# Patient Record
Sex: FEMALE | URBAN - METROPOLITAN AREA
[De-identification: names, ages, dates, MRNs, and addresses within clinical notes are randomized per-mention and may not be internally consistent; named-entity substitution may affect disease eponyms.]

---

## 2021-03-05 ENCOUNTER — APPOINTMENT (RX ONLY)
Dept: URBAN - METROPOLITAN AREA CLINIC 55 | Facility: CLINIC | Age: 51
Setting detail: DERMATOLOGY
End: 2021-03-05

## 2021-03-05 DIAGNOSIS — Z41.9 ENCOUNTER FOR PROCEDURE FOR PURPOSES OTHER THAN REMEDYING HEALTH STATE, UNSPECIFIED: ICD-10-CM

## 2021-03-05 PROCEDURE — ? BOTOX

## 2021-03-05 NOTE — PROCEDURE: BOTOX
[place] Units: 0
Show Lcl Units: No
Show Additional Area 1: Yes
Forehead Units: 4
Glabellar Complex Units: 8
Price (Use Numbers Only, No Special Characters Or $): 207
Consent: Written consent obtained. Patient denies pregnancy and breastfeeding. Risks include but not limited to lid/brow ptosis, bruising, swelling, diplopia, temporary effect, incomplete chemical denervation.
Detail Level: Detailed
Additional Area 1 Location: upper lip
Post-Care Instructions: Patient instructed to not lie down for 4 hours post procedure and informed not to manipulate treatment area for 4 hours. \\nRecommended follow up in 7 days if needed.
Lot #: [id number]
Additional Area 2 Location: chin
Expiration Date (Month Year): 12/2023
Periorbital Skin Units: 16

## 2021-05-27 ENCOUNTER — APPOINTMENT (RX ONLY)
Dept: URBAN - METROPOLITAN AREA CLINIC 55 | Facility: CLINIC | Age: 51
Setting detail: DERMATOLOGY
End: 2021-05-27

## 2021-05-27 DIAGNOSIS — Z41.9 ENCOUNTER FOR PROCEDURE FOR PURPOSES OTHER THAN REMEDYING HEALTH STATE, UNSPECIFIED: ICD-10-CM

## 2021-05-27 PROCEDURE — ? BOTOX

## 2021-05-27 NOTE — PROCEDURE: BOTOX
Additional Area 6 Units: 0
Detail Level: Detailed
Show Additional Area 6: Yes
Show Lcl Units: No
Forehead Units: 4
Additional Area 3 Location: Chin
Consent: Written consent obtained. Risks include but not limited to lid/brow ptosis, bruising, swelling, diplopia, temporary effect, incomplete chemical denervation.
Periorbital Skin Units: 16
Lot #: [id number]
Additional Area 2 Location: upper and lower lip
Additional Area 1 Location: Lateral Brows
Glabellar Complex Units: 8
Expiration Date (Month Year): 9/2023
Post-Care Instructions: Patient instructed to not lie down for 4 hours and limit physical activity for 24 hours.

## 2021-10-01 ENCOUNTER — APPOINTMENT (RX ONLY)
Dept: URBAN - METROPOLITAN AREA CLINIC 55 | Facility: CLINIC | Age: 51
Setting detail: DERMATOLOGY
End: 2021-10-01

## 2021-10-01 DIAGNOSIS — Z41.9 ENCOUNTER FOR PROCEDURE FOR PURPOSES OTHER THAN REMEDYING HEALTH STATE, UNSPECIFIED: ICD-10-CM

## 2021-10-01 PROCEDURE — ? BOTOX

## 2021-10-01 NOTE — PROCEDURE: BOTOX
Left Periorbital Skin Units: 0
Forehead Units: 4
Show Right And Left Brow Units: No
Show Lateral Platysmal Band Units: Yes
Additional Area 4 Location: upper cutaneous lip
Lot #: [id number]
Additional Area 3 Location: Chin
Glabellar Complex Units: 8
Additional Area 2 Location: Upper lip
Post-Care Instructions: Patient instructed to not lie down for 4 hours and limit physical activity for 24 hours.
Periorbital Skin Units: 16
Detail Level: Detailed
Additional Area 1 Location: Left Lateral Brow
Expiration Date (Month Year): 11/2023
Consent: Written consent obtained. Risks include but not limited to lid/brow ptosis, bruising, swelling, diplopia, temporary effect, incomplete chemical denervation.

## 2022-02-09 ENCOUNTER — APPOINTMENT (RX ONLY)
Dept: URBAN - METROPOLITAN AREA CLINIC 55 | Facility: CLINIC | Age: 52
Setting detail: DERMATOLOGY
End: 2022-02-09

## 2022-02-09 DIAGNOSIS — Z41.9 ENCOUNTER FOR PROCEDURE FOR PURPOSES OTHER THAN REMEDYING HEALTH STATE, UNSPECIFIED: ICD-10-CM

## 2022-02-09 PROCEDURE — ? BOTOX

## 2022-02-09 NOTE — PROCEDURE: BOTOX
[facility] Units: 0
Periorbital Skin Units: 16
Show Forehead Units: Yes
Show Ucl Units: No
Consent: Written consent obtained. Risks include but not limited to lid/brow ptosis, bruising, swelling, diplopia, temporary effect, incomplete chemical denervation.
Forehead Units: 4
Additional Area 2 Location: Upper lip
Additional Area 1 Location: Left Lateral Brow
Additional Area 3 Location: Chin
Additional Area 4 Location: upper cutaneous lip
Post-Care Instructions: Patient instructed to not lie down for 4 hours and limit physical activity for 24 hours.
Glabellar Complex Units: 8
Expiration Date (Month Year): 4/2024
Detail Level: Detailed
Lot #: [id number]

## 2022-05-24 ENCOUNTER — APPOINTMENT (RX ONLY)
Dept: URBAN - METROPOLITAN AREA CLINIC 55 | Facility: CLINIC | Age: 52
Setting detail: DERMATOLOGY
End: 2022-05-24

## 2022-05-24 DIAGNOSIS — Z41.9 ENCOUNTER FOR PROCEDURE FOR PURPOSES OTHER THAN REMEDYING HEALTH STATE, UNSPECIFIED: ICD-10-CM

## 2022-05-24 PROCEDURE — ? BOTOX

## 2022-05-24 NOTE — PROCEDURE: BOTOX
Additional Area 2 Units: 0
Show Ucl Units: No
Expiration Date (Month Year): 4/2024
Show Nasal Units: Yes
Lot #: [id number]
Additional Area 2 Location: Upper lip
Additional Area 3 Location: Chin
Consent: Written consent obtained. Risks include but not limited to lid/brow ptosis, bruising, swelling, diplopia, temporary effect, incomplete chemical denervation.
Glabellar Complex Units: 8
Post-Care Instructions: Patient instructed to not lie down for 4 hours and limit physical activity for 24 hours.
Detail Level: Detailed
Periorbital Skin Units: 16
Additional Area 1 Location: Left Lateral Brow
Dilution (U/0.1 Cc): 4

## 2022-09-17 ENCOUNTER — APPOINTMENT (RX ONLY)
Dept: URBAN - METROPOLITAN AREA CLINIC 55 | Facility: CLINIC | Age: 52
Setting detail: DERMATOLOGY
End: 2022-09-17

## 2022-09-17 DIAGNOSIS — Z41.9 ENCOUNTER FOR PROCEDURE FOR PURPOSES OTHER THAN REMEDYING HEALTH STATE, UNSPECIFIED: ICD-10-CM

## 2022-09-17 PROCEDURE — ? BOTOX

## 2022-09-17 NOTE — PROCEDURE: BOTOX
R Brow Units: 0
Show Additional Area 6: Yes
Show Right And Left Brow Units: No
Show Inventory Tab: Show
Consent: Verbal consent obtained. Risks include but not limited to lid/brow ptosis, bruising, swelling, diplopia, temporary effect, incomplete chemical denervation.
Additional Area 2 Location: chin
Post-Care Instructions: Patient instructed to not lie down for 4 hours and limit physical activity for 24 hours.
Periorbital Skin Units: 16
Detail Level: Detailed
Forehead Units: 4
Additional Area 1 Location: upper lip
Glabellar Complex Units: 8

## 2023-01-07 ENCOUNTER — APPOINTMENT (RX ONLY)
Dept: URBAN - METROPOLITAN AREA CLINIC 55 | Facility: CLINIC | Age: 53
Setting detail: DERMATOLOGY
End: 2023-01-07

## 2023-01-07 DIAGNOSIS — Z41.9 ENCOUNTER FOR PROCEDURE FOR PURPOSES OTHER THAN REMEDYING HEALTH STATE, UNSPECIFIED: ICD-10-CM

## 2023-01-07 PROCEDURE — ? BOTOX

## 2023-01-07 NOTE — PROCEDURE: BOTOX
Left Periorbital Skin Units: 0
Show Orbicularis Oculi Units: Yes
Show Right And Left Pupillary Line Units: No
Consent: Verbal consent obtained. Risks include but not limited to lid/brow ptosis, bruising, swelling, diplopia, temporary effect, incomplete chemical denervation.
Forehead Units: 4
Detail Level: Detailed
Glabellar Complex Units: 8
Additional Area 1 Location: upper lip
Post-Care Instructions: Patient instructed to not lie down for 4 hours and limit physical activity for 24 hours.
Show Inventory Tab: Show
Periorbital Skin Units: 16
Additional Area 2 Location: chin

## 2023-05-18 ENCOUNTER — APPOINTMENT (RX ONLY)
Dept: URBAN - METROPOLITAN AREA CLINIC 55 | Facility: CLINIC | Age: 53
Setting detail: DERMATOLOGY
End: 2023-05-18

## 2023-05-18 DIAGNOSIS — Z41.9 ENCOUNTER FOR PROCEDURE FOR PURPOSES OTHER THAN REMEDYING HEALTH STATE, UNSPECIFIED: ICD-10-CM

## 2023-05-18 PROCEDURE — ? BOTOX

## 2023-05-18 NOTE — PROCEDURE: BOTOX
Forehead Units: 0
Show Additional Area 3: Yes
Glabellar Complex Units: 8
Dilution (U/0.1 Cc): 4
Show Mentalis Units: No
Additional Area 1 Location: upper lip
Detail Level: Detailed
Consent: Verbal consent obtained. Risks include but not limited to lid/brow ptosis, bruising, swelling, diplopia, temporary effect, incomplete chemical denervation.
Periorbital Skin Units: 16
Additional Area 3 Location: lower lip
Post-Care Instructions: Patient instructed to not lie down for 4 hours and limit physical activity for 24 hours.
Incrementing Botox Units: By 0.5 Units
Show Inventory Tab: Show
Additional Area 2 Location: chin

## 2023-09-21 ENCOUNTER — APPOINTMENT (RX ONLY)
Dept: URBAN - METROPOLITAN AREA CLINIC 55 | Facility: CLINIC | Age: 53
Setting detail: DERMATOLOGY
End: 2023-09-21

## 2023-09-21 DIAGNOSIS — Z41.9 ENCOUNTER FOR PROCEDURE FOR PURPOSES OTHER THAN REMEDYING HEALTH STATE, UNSPECIFIED: ICD-10-CM

## 2023-09-21 PROCEDURE — ? BOTOX

## 2023-09-21 NOTE — PROCEDURE: BOTOX
Show Right And Left Brow Units: No
Left Pupillary Line Units: 0
Show Depressor Anguli Units: Yes
Detail Level: Detailed
Periorbital Skin Units: 16
Additional Area 3 Location: lower lip
Forehead Units: 4
Show Inventory Tab: Show
Consent: Verbal consent obtained. Risks include but not limited to lid/brow ptosis, bruising, swelling, diplopia, temporary effect, incomplete chemical denervation.
Additional Area 2 Location: chin
Post-Care Instructions: Patient instructed to not lie down for 4 hours and limit physical activity for 24 hours.
Glabellar Complex Units: 8
Additional Area 1 Location: upper lip
Incrementing Botox Units: By 0.5 Units

## 2024-01-19 ENCOUNTER — APPOINTMENT (RX ONLY)
Dept: URBAN - METROPOLITAN AREA CLINIC 55 | Facility: CLINIC | Age: 54
Setting detail: DERMATOLOGY
End: 2024-01-19

## 2024-02-01 ENCOUNTER — APPOINTMENT (RX ONLY)
Dept: URBAN - METROPOLITAN AREA CLINIC 55 | Facility: CLINIC | Age: 54
Setting detail: DERMATOLOGY
End: 2024-02-01

## 2024-02-01 DIAGNOSIS — Z41.9 ENCOUNTER FOR PROCEDURE FOR PURPOSES OTHER THAN REMEDYING HEALTH STATE, UNSPECIFIED: ICD-10-CM

## 2024-02-01 PROCEDURE — ? BOTOX

## 2024-02-01 NOTE — PROCEDURE: BOTOX
Mentalis Units: 0
Show Levator Superior Units: Yes
Additional Area 3 Location: lower lip
Show Inventory Tab: Show
Show Ucl Units: No
Post-Care Instructions: Patient instructed to not lie down for 4 hours and limit physical activity for 24 hours.
Consent: Verbal consent obtained. Risks include but not limited to lid/brow ptosis, bruising, swelling, diplopia, temporary effect, incomplete chemical denervation.
Dilution (U/0.1 Cc): 4
Additional Area 2 Location: chin
Glabellar Complex Units: 12
Incrementing Botox Units: By 0.5 Units
Additional Area 1 Location: upper lip
Detail Level: Detailed
Periorbital Skin Units: 16

## 2024-05-23 ENCOUNTER — APPOINTMENT (RX ONLY)
Dept: URBAN - METROPOLITAN AREA CLINIC 55 | Facility: CLINIC | Age: 54
Setting detail: DERMATOLOGY
End: 2024-05-23

## 2024-05-23 DIAGNOSIS — Z41.9 ENCOUNTER FOR PROCEDURE FOR PURPOSES OTHER THAN REMEDYING HEALTH STATE, UNSPECIFIED: ICD-10-CM

## 2024-05-23 PROCEDURE — ? BOTOX

## 2024-05-23 NOTE — PROCEDURE: BOTOX
Periorbital Skin Units: 16
Show Additional Area 5: Yes
Right Pupillary Line Units: 0
Consent: Verbal consent obtained. Risks include but not limited to lid/brow ptosis, bruising, swelling, diplopia, temporary effect, incomplete chemical denervation.
Show Right And Left Brow Units: No
Detail Level: Detailed
Additional Area 1 Location: upper lip
Incrementing Botox Units: By 0.5 Units
Post-Care Instructions: Patient instructed to not lie down for 4 hours and limit physical activity for 24 hours.
Forehead Units: 4
Show Inventory Tab: Show
Glabellar Complex Units: 8
Additional Area 3 Location: lower lip
Additional Area 2 Location: chin

## 2024-09-19 ENCOUNTER — APPOINTMENT (RX ONLY)
Dept: URBAN - METROPOLITAN AREA CLINIC 55 | Facility: CLINIC | Age: 54
Setting detail: DERMATOLOGY
End: 2024-09-19

## 2024-09-19 DIAGNOSIS — Z41.9 ENCOUNTER FOR PROCEDURE FOR PURPOSES OTHER THAN REMEDYING HEALTH STATE, UNSPECIFIED: ICD-10-CM

## 2024-09-19 PROCEDURE — ? BOTOX

## 2024-09-19 NOTE — PROCEDURE: BOTOX
Mentalis Units: 0
Incrementing Botox Units: By 0.5 Units
Show Mentalis Units: No
Show Additional Area 6: Yes
Additional Area 2 Location: chin
Periorbital Skin Units: 8
Additional Area 1 Location: upper lip
Dilution (U/0.1 Cc): 4
Show Inventory Tab: Show
Glabellar Complex Units: 16
Consent: Verbal consent obtained. Risks include but not limited to lid/brow ptosis, bruising, swelling, diplopia, temporary effect, incomplete chemical denervation.
Post-Care Instructions: Patient instructed to not lie down for 4 hours and limit physical activity for 24 hours.
Additional Area 3 Location: lower lip
Detail Level: Detailed

## 2025-01-27 ENCOUNTER — APPOINTMENT (OUTPATIENT)
Dept: URBAN - METROPOLITAN AREA CLINIC 55 | Facility: CLINIC | Age: 55
Setting detail: DERMATOLOGY
End: 2025-01-27

## 2025-01-27 DIAGNOSIS — Z41.9 ENCOUNTER FOR PROCEDURE FOR PURPOSES OTHER THAN REMEDYING HEALTH STATE, UNSPECIFIED: ICD-10-CM

## 2025-01-27 PROCEDURE — ? BOTOX

## 2025-01-27 NOTE — PROCEDURE: BOTOX
Left Periorbital Skin Units: 0
Comments: Make up was removed from treatment area and then prepped with 70% isopropyl alcohol prior to injections.
Show Orbicularis Oculi Units: Yes
Incrementing Botox Units: By 0.5 Units
Additional Area 1 Location: upper lip
Show Right And Left Pupillary Line Units: No
Detail Level: Detailed
Forehead Units: 4
Show Inventory Tab: Show
Consent obtained and risk reviewed.
Post-Care Instructions: Discussed not to lie down flat  or vigorously rub the treatment area for the next 4 hours .
Periorbital Skin Units: 16
Glabellar Complex Units: 8